# Patient Record
Sex: FEMALE | Race: BLACK OR AFRICAN AMERICAN | NOT HISPANIC OR LATINO | Employment: FULL TIME | URBAN - METROPOLITAN AREA
[De-identification: names, ages, dates, MRNs, and addresses within clinical notes are randomized per-mention and may not be internally consistent; named-entity substitution may affect disease eponyms.]

---

## 2017-01-12 ENCOUNTER — TELEPHONE (OUTPATIENT)
Dept: PEDIATRICS | Facility: CLINIC | Age: 27
End: 2017-01-12

## 2017-01-12 NOTE — TELEPHONE ENCOUNTER
----- Message from Orquidea wOen sent at 1/12/2017 10:16 AM CST -----  Contact: Rojas / [facility] [phone]  Rojas / [facility] [phone]---------calling to check the status of the summary report for the titers that were drawn on the pt. Rojas is requesting a call back

## 2017-01-12 NOTE — TELEPHONE ENCOUNTER
I have no idea what this is about.  This patient is 26 yrs old and therefore has not been our patient is several years.  This must have come to our office by mistake. Please call them back and tell them she has not been seen by a provider in this iffice since prior to December 2012

## 2017-01-12 NOTE — TELEPHONE ENCOUNTER
Informed [facility] that this is general pediatrics they stated they are looking for employee health. Number given.

## 2017-08-01 ENCOUNTER — CLINICAL SUPPORT (OUTPATIENT)
Dept: OCCUPATIONAL MEDICINE | Facility: CLINIC | Age: 27
End: 2017-08-01

## 2017-08-01 DIAGNOSIS — Z02.1 PHYSICAL EXAM, PRE-EMPLOYMENT: Primary | ICD-10-CM

## 2017-08-01 RX ORDER — NORGESTIMATE AND ETHINYL ESTRADIOL 0.25-0.035
1 KIT ORAL DAILY
Refills: 3 | COMMUNITY
Start: 2017-06-22

## 2017-08-02 LAB — HBV SURFACE AB SER QL: REACTIVE
